# Patient Record
Sex: FEMALE | URBAN - METROPOLITAN AREA
[De-identification: names, ages, dates, MRNs, and addresses within clinical notes are randomized per-mention and may not be internally consistent; named-entity substitution may affect disease eponyms.]

---

## 2020-03-06 ENCOUNTER — OFFICE VISIT (OUTPATIENT)
Dept: URBAN - METROPOLITAN AREA CLINIC 62 | Facility: CLINIC | Age: 65
End: 2020-03-06
Payer: COMMERCIAL

## 2020-03-06 PROCEDURE — 99204 OFFICE O/P NEW MOD 45 MIN: CPT | Performed by: OPHTHALMOLOGY

## 2020-03-06 ASSESSMENT — VISUAL ACUITY
OD: 20/40
OS: 20/40

## 2020-03-06 ASSESSMENT — KERATOMETRY
OD: 44.63
OS: 44.25

## 2020-03-06 ASSESSMENT — INTRAOCULAR PRESSURE
OS: 14
OD: 15

## 2020-03-06 NOTE — IMPRESSION/PLAN
Impression: Combined forms of age-related cataract, bilateral: H25.813. Plan: Cataract accounts for pt complaints. Pt desires sx. Schedule CE/IOL both eyes, right then left. Risk/Benefits/Alternatives discussed with patient. Rec. mono-focal vs Toric. Consider ORA/LRI. RL2. Target distance. Combination drops. Rec. Intra-ocular cefuroxime to decrease the risk of endophthalmitis.

## 2020-05-22 ENCOUNTER — TESTING ONLY (OUTPATIENT)
Dept: URBAN - METROPOLITAN AREA CLINIC 62 | Facility: CLINIC | Age: 65
End: 2020-05-22
Payer: COMMERCIAL

## 2020-05-22 DIAGNOSIS — H25.813 COMBINED FORMS OF AGE-RELATED CATARACT, BILATERAL: Primary | ICD-10-CM

## 2020-05-22 PROCEDURE — 92136 OPHTHALMIC BIOMETRY: CPT | Performed by: OPHTHALMOLOGY

## 2020-05-22 ASSESSMENT — PACHYMETRY
OS: 25.40
OD: 3.48
OD: 24.77
OS: 3.49

## 2020-06-03 ENCOUNTER — SURGERY (OUTPATIENT)
Dept: URBAN - METROPOLITAN AREA SURGERY 40 | Facility: SURGERY | Age: 65
End: 2020-06-03
Payer: COMMERCIAL

## 2020-06-03 ENCOUNTER — Encounter (OUTPATIENT)
Dept: URBAN - METROPOLITAN AREA SURGERY 39 | Facility: SURGERY | Age: 65
End: 2020-06-03
Payer: COMMERCIAL

## 2020-06-03 PROCEDURE — 66984 XCAPSL CTRC RMVL W/O ECP: CPT | Performed by: OPHTHALMOLOGY

## 2020-06-04 ENCOUNTER — POST-OPERATIVE VISIT (OUTPATIENT)
Dept: URBAN - METROPOLITAN AREA CLINIC 62 | Facility: CLINIC | Age: 65
End: 2020-06-04

## 2020-06-04 DIAGNOSIS — Z09 ENCNTR FOR F/U EXAM AFT TRTMT FOR COND OTH THAN MALIG NEOPLM: Primary | ICD-10-CM

## 2020-06-04 ASSESSMENT — INTRAOCULAR PRESSURE: OD: 24

## 2020-06-08 ENCOUNTER — POST-OPERATIVE VISIT (OUTPATIENT)
Dept: URBAN - METROPOLITAN AREA CLINIC 62 | Facility: CLINIC | Age: 65
End: 2020-06-08

## 2020-06-08 PROCEDURE — 99024 POSTOP FOLLOW-UP VISIT: CPT | Performed by: OPTOMETRIST

## 2020-06-08 ASSESSMENT — INTRAOCULAR PRESSURE: OD: 15

## 2020-06-15 ENCOUNTER — POST-OPERATIVE VISIT (OUTPATIENT)
Dept: URBAN - METROPOLITAN AREA CLINIC 62 | Facility: CLINIC | Age: 65
End: 2020-06-15

## 2020-06-15 ASSESSMENT — INTRAOCULAR PRESSURE: OD: 16

## 2020-06-15 ASSESSMENT — VISUAL ACUITY: OD: 20/30-2

## 2020-06-23 ENCOUNTER — SURGERY (OUTPATIENT)
Dept: URBAN - METROPOLITAN AREA SURGERY 40 | Facility: SURGERY | Age: 65
End: 2020-06-23
Payer: COMMERCIAL

## 2020-06-23 PROCEDURE — 66984 XCAPSL CTRC RMVL W/O ECP: CPT | Performed by: OPHTHALMOLOGY

## 2020-06-24 ENCOUNTER — Encounter (OUTPATIENT)
Dept: URBAN - METROPOLITAN AREA SURGERY 39 | Facility: SURGERY | Age: 65
End: 2020-06-24
Payer: COMMERCIAL

## 2020-06-25 ENCOUNTER — POST-OPERATIVE VISIT (OUTPATIENT)
Dept: URBAN - METROPOLITAN AREA CLINIC 62 | Facility: CLINIC | Age: 65
End: 2020-06-25

## 2020-06-25 ASSESSMENT — INTRAOCULAR PRESSURE: OS: 13

## 2020-07-01 ENCOUNTER — POST-OPERATIVE VISIT (OUTPATIENT)
Dept: URBAN - METROPOLITAN AREA CLINIC 62 | Facility: CLINIC | Age: 65
End: 2020-07-01

## 2020-07-01 ASSESSMENT — VISUAL ACUITY: OS: 20/30

## 2020-07-01 ASSESSMENT — INTRAOCULAR PRESSURE
OS: 16
OD: 15

## 2020-07-22 ENCOUNTER — POST-OPERATIVE VISIT (OUTPATIENT)
Dept: URBAN - METROPOLITAN AREA CLINIC 62 | Facility: CLINIC | Age: 65
End: 2020-07-22

## 2020-07-22 ASSESSMENT — INTRAOCULAR PRESSURE
OD: 14
OS: 15

## 2020-07-22 ASSESSMENT — VISUAL ACUITY
OS: 20/40-
OD: 20/25

## 2020-08-07 ENCOUNTER — POST-OPERATIVE VISIT (OUTPATIENT)
Dept: URBAN - METROPOLITAN AREA CLINIC 62 | Facility: CLINIC | Age: 65
End: 2020-08-07

## 2020-08-07 DIAGNOSIS — Z48.810 ENCNTR FOR SURGICAL AFTCR FOL SURGERY ON THE SENSE ORGANS: ICD-10-CM

## 2020-08-07 DIAGNOSIS — H26.493 OTHER SECONDARY CATARACT, BILATERAL: Primary | ICD-10-CM

## 2020-08-07 ASSESSMENT — VISUAL ACUITY
OD: 20/25
OS: 20/25

## 2020-08-07 ASSESSMENT — INTRAOCULAR PRESSURE
OS: 15
OD: 14

## 2020-08-07 NOTE — IMPRESSION/PLAN
Impression: Other secondary cataract, bilateral: H26.493. Plan: Capsular opacification accounts for symptoms. Pt elects sx. Schedule YAG capsulotomy, OU same day. Discussed R/B/A. RL2. Premium lens (N/C).

## 2020-09-16 ENCOUNTER — SURGERY (OUTPATIENT)
Dept: URBAN - METROPOLITAN AREA SURGERY 40 | Facility: SURGERY | Age: 65
End: 2020-09-16
Payer: COMMERCIAL

## 2020-09-21 ENCOUNTER — POST-OPERATIVE VISIT (OUTPATIENT)
Dept: URBAN - METROPOLITAN AREA CLINIC 62 | Facility: CLINIC | Age: 65
End: 2020-09-21
Payer: COMMERCIAL

## 2020-09-21 PROCEDURE — 99024 POSTOP FOLLOW-UP VISIT: CPT | Performed by: OPHTHALMOLOGY

## 2020-09-21 ASSESSMENT — INTRAOCULAR PRESSURE
OD: 14
OS: 13

## 2020-09-21 ASSESSMENT — VISUAL ACUITY
OD: 20/25
OS: 20/30

## 2020-09-21 NOTE — IMPRESSION/PLAN
Impression:  Presence of intraocular lens  Z96.1. Plan: Collagen plugs placed today RLL, LLL 4mm. Measure Astigmatism after cornea clears.

## 2020-10-02 ENCOUNTER — POST-OPERATIVE VISIT (OUTPATIENT)
Dept: URBAN - METROPOLITAN AREA CLINIC 62 | Facility: CLINIC | Age: 65
End: 2020-10-02
Payer: COMMERCIAL

## 2020-10-02 RX ORDER — PREDNISOLONE ACETATE 10 MG/ML
1 % SUSPENSION/ DROPS OPHTHALMIC
Qty: 1 | Refills: 1 | Status: INACTIVE
Start: 2020-10-02 | End: 2020-11-16

## 2020-10-02 ASSESSMENT — INTRAOCULAR PRESSURE
OD: 14
OS: 15

## 2020-10-02 ASSESSMENT — VISUAL ACUITY
OD: 20/25-
OS: 20/30-2

## 2020-10-02 NOTE — IMPRESSION/PLAN
Impression: S/P YAG - posterior capsulotomy OU - 16 Days. Encounter for surgical aftercare following surgery on a sense organ  Z48.810. Dryness improved but still affecting vision. Plan: Re-check in 3 to 4 weeks. MRx next visit. Soothe XP OU QID- 5x
D/C - Altaf Garcia Pred QID OD. AT PRN.

## 2020-11-16 ENCOUNTER — POST-OPERATIVE VISIT (OUTPATIENT)
Dept: URBAN - METROPOLITAN AREA CLINIC 62 | Facility: CLINIC | Age: 65
End: 2020-11-16
Payer: COMMERCIAL

## 2020-11-16 RX ORDER — BACITRACIN 500 [USP'U]/G
OINTMENT OPHTHALMIC
Qty: 1 | Refills: 4 | Status: INACTIVE
Start: 2020-11-16 | End: 2021-01-11

## 2020-11-16 ASSESSMENT — VISUAL ACUITY
OD: 20/30
OS: 20/30

## 2020-11-16 ASSESSMENT — INTRAOCULAR PRESSURE
OS: 15
OD: 15

## 2020-11-16 NOTE — IMPRESSION/PLAN
Impression: S/P YAG - posterior capsulotomy OU - 61 Days. Encounter for surgical aftercare following surgery on a sense organ  Z48.810. Plan: Dry eyes and blepharitis - Lid scrubs BID, Cequa BID. Bacitracin qHS OU.  Systane OU QID

## 2021-01-11 ENCOUNTER — POST-OPERATIVE VISIT (OUTPATIENT)
Dept: URBAN - METROPOLITAN AREA CLINIC 62 | Facility: CLINIC | Age: 66
End: 2021-01-11
Payer: COMMERCIAL

## 2021-01-11 RX ORDER — SODIUM CHLORIDE 50 MG/G
5 % OINTMENT OPHTHALMIC
Qty: 1 | Refills: 4 | Status: INACTIVE
Start: 2021-01-11 | End: 2021-01-29

## 2021-01-11 RX ORDER — NEOMYCIN SULFATE, POLYMYXIN B SULFATE AND DEXAMETHASONE 3.5; 10000; 1 MG/ML; [USP'U]/ML; MG/ML
SUSPENSION OPHTHALMIC
Qty: 5 | Refills: 4 | Status: INACTIVE
Start: 2021-01-11 | End: 2021-01-29

## 2021-01-11 ASSESSMENT — KERATOMETRY
OD: 44.50
OS: 44.13

## 2021-01-11 ASSESSMENT — VISUAL ACUITY
OD: 20/30
OS: 20/30

## 2021-01-11 ASSESSMENT — INTRAOCULAR PRESSURE
OS: 14
OD: 15

## 2021-01-11 NOTE — IMPRESSION/PLAN
Impression: S/P YAG - posterior capsulotomy OU - 117 Days. Encounter for surgical aftercare following surgery on a sense organ  Z48.810. 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 Plan: Start   Maxitrol gtts QID. Sodium chloride oint QHS.

## 2021-01-29 ENCOUNTER — POST-OPERATIVE VISIT (OUTPATIENT)
Dept: URBAN - METROPOLITAN AREA CLINIC 62 | Facility: CLINIC | Age: 66
End: 2021-01-29
Payer: COMMERCIAL

## 2021-01-29 ASSESSMENT — VISUAL ACUITY
OD: 20/40
OS: 20/40

## 2021-01-29 ASSESSMENT — INTRAOCULAR PRESSURE
OD: 15
OS: 15

## 2021-01-29 NOTE — IMPRESSION/PLAN
Impression: S/P YAG - posterior capsulotomy OU - 135 Days. Presence of intraocular lens  Z96.1. Plan: Silicone plug placed  LLL 0.5mm Collagen plug placed RLL 0.3mm D/C Maxitrol drops D/C Sodium Chloride oint Systane ou TID< QID OU
PF AT QID

## 2021-02-19 ENCOUNTER — POST-OPERATIVE VISIT (OUTPATIENT)
Dept: URBAN - METROPOLITAN AREA CLINIC 62 | Facility: CLINIC | Age: 66
End: 2021-02-19
Payer: COMMERCIAL

## 2021-02-19 ASSESSMENT — INTRAOCULAR PRESSURE
OD: 16
OS: 15

## 2021-02-19 NOTE — IMPRESSION/PLAN
Impression: S/P YAG - posterior capsulotomy OU - 156 Days. Presence of intraocular lens  Z96.1. S/P Panoptix Plan: Symptoms from dry eyes. No improvement with plugs, xiidra, pred. Trial Restasis BID OU. Cont. tears q 1 to 2 hours. 
 Consider Imprimis cycloporene

## 2021-03-22 ENCOUNTER — POST-OPERATIVE VISIT (OUTPATIENT)
Dept: URBAN - METROPOLITAN AREA CLINIC 63 | Facility: CLINIC | Age: 66
End: 2021-03-22
Payer: COMMERCIAL

## 2021-03-22 DIAGNOSIS — Z96.1 PRESENCE OF INTRAOCULAR LENS: Primary | ICD-10-CM

## 2021-03-22 RX ORDER — CYCLOSPORINE/CHONDROITIN SULFATE PF 1 MG/ML
EMULSION OPHTHALMIC
Qty: 10 | Refills: 11 | Status: INACTIVE
Start: 2021-03-22 | End: 2021-03-25

## 2021-03-22 ASSESSMENT — VISUAL ACUITY
OS: 20/30
OD: 20/30

## 2021-03-22 ASSESSMENT — INTRAOCULAR PRESSURE
OD: 12
OS: 12

## 2021-03-22 NOTE — IMPRESSION/PLAN
Impression: S/P YAG - posterior capsulotomy OU - 187 Days. Presence of intraocular lens  Z96.1. Post operative instructions reviewed -

Dryness improved. Plan: Dryness improved with Restasis. D/C Restasis due to cost, change to Klarity -C OU BID. Add  Eysuvis OU QID x 2 weeks. Restasis OU BID Preservative free artificial tears OU 4-5 times a day

## 2021-06-25 ENCOUNTER — POST-OPERATIVE VISIT (OUTPATIENT)
Dept: URBAN - METROPOLITAN AREA CLINIC 63 | Facility: CLINIC | Age: 66
End: 2021-06-25
Payer: COMMERCIAL

## 2021-06-25 ASSESSMENT — VISUAL ACUITY
OD: 20/25
OS: 20/25

## 2021-06-25 ASSESSMENT — INTRAOCULAR PRESSURE
OD: 14
OS: 14

## 2021-06-25 NOTE — IMPRESSION/PLAN
Impression: S/P CE/IOL PANOPTIX TFNT40 +14.0 ORA OS - 366 Days. Presence of intraocular lens  Z96.1. Residual Astigmatism. Plan: Refraction by Dr. Omar Nevarez next visit (no drops) and trial frames to determine benefit of LRIs. + cornea staining with blepharitis. No improvement on various drops. Rec. evaluation with specialist for further treatment. Lid scrubs BID. Cont. AT QID.

## 2021-07-26 ENCOUNTER — POST-OPERATIVE VISIT (OUTPATIENT)
Dept: URBAN - METROPOLITAN AREA CLINIC 63 | Facility: CLINIC | Age: 66
End: 2021-07-26
Payer: COMMERCIAL

## 2021-07-26 ASSESSMENT — KERATOMETRY
OS: 44.38
OD: 44.63

## 2021-07-26 ASSESSMENT — VISUAL ACUITY
OS: 20/25
OD: 20/25

## 2022-03-14 ENCOUNTER — OFFICE VISIT (OUTPATIENT)
Dept: URBAN - METROPOLITAN AREA CLINIC 63 | Facility: CLINIC | Age: 67
End: 2022-03-14
Payer: COMMERCIAL

## 2022-03-14 DIAGNOSIS — H04.123 DRY EYE SYNDROME OF BILATERAL LACRIMAL GLANDS: Primary | ICD-10-CM

## 2022-03-14 PROCEDURE — 99213 OFFICE O/P EST LOW 20 MIN: CPT | Performed by: OPHTHALMOLOGY

## 2022-03-14 ASSESSMENT — KERATOMETRY
OS: 44.38
OD: 44.88

## 2022-03-14 ASSESSMENT — VISUAL ACUITY
OD: 20/30
OS: 20/40

## 2022-03-14 NOTE — IMPRESSION/PLAN
Impression: Dry eye syndrome of bilateral lacrimal glands: H04.123. Plan: Pt with corneal staining despite treatment with Eyvusis, Xiidra, Cequa, cyclosporine, NaCL, Prednisolone, plugs. Causing smudging of the vision. Refer to corneal specialist for evaluation.